# Patient Record
Sex: MALE | Race: WHITE | NOT HISPANIC OR LATINO
[De-identification: names, ages, dates, MRNs, and addresses within clinical notes are randomized per-mention and may not be internally consistent; named-entity substitution may affect disease eponyms.]

---

## 2020-01-01 ENCOUNTER — RESULT REVIEW (OUTPATIENT)
Age: 65
End: 2020-01-01

## 2020-01-01 ENCOUNTER — APPOINTMENT (OUTPATIENT)
Dept: GERIATRICS | Facility: CLINIC | Age: 65
End: 2020-01-01

## 2020-01-01 ENCOUNTER — NON-APPOINTMENT (OUTPATIENT)
Age: 65
End: 2020-01-01

## 2020-01-01 ENCOUNTER — APPOINTMENT (OUTPATIENT)
Dept: HEMATOLOGY ONCOLOGY | Facility: CLINIC | Age: 65
End: 2020-01-01
Payer: COMMERCIAL

## 2020-01-01 ENCOUNTER — APPOINTMENT (OUTPATIENT)
Dept: THORACIC SURGERY | Facility: CLINIC | Age: 65
End: 2020-01-01
Payer: COMMERCIAL

## 2020-01-01 ENCOUNTER — APPOINTMENT (OUTPATIENT)
Dept: THORACIC SURGERY | Facility: CLINIC | Age: 65
End: 2020-01-01

## 2020-01-01 ENCOUNTER — APPOINTMENT (OUTPATIENT)
Dept: HEMATOLOGY ONCOLOGY | Facility: CLINIC | Age: 65
End: 2020-01-01

## 2020-01-01 ENCOUNTER — APPOINTMENT (OUTPATIENT)
Dept: THORACIC SURGERY | Facility: HOSPITAL | Age: 65
End: 2020-01-01

## 2020-01-01 VITALS
TEMPERATURE: 98.1 F | SYSTOLIC BLOOD PRESSURE: 133 MMHG | OXYGEN SATURATION: 97 % | DIASTOLIC BLOOD PRESSURE: 69 MMHG | WEIGHT: 210 LBS | HEART RATE: 77 BPM | RESPIRATION RATE: 18 BRPM

## 2020-01-01 VITALS
WEIGHT: 205 LBS | HEART RATE: 61 BPM | DIASTOLIC BLOOD PRESSURE: 70 MMHG | BODY MASS INDEX: 27.77 KG/M2 | SYSTOLIC BLOOD PRESSURE: 153 MMHG | HEIGHT: 71.97 IN | RESPIRATION RATE: 18 BRPM | OXYGEN SATURATION: 95 % | TEMPERATURE: 97.5 F

## 2020-01-01 VITALS
OXYGEN SATURATION: 96 % | HEART RATE: 68 BPM | SYSTOLIC BLOOD PRESSURE: 177 MMHG | HEIGHT: 71.97 IN | DIASTOLIC BLOOD PRESSURE: 77 MMHG | RESPIRATION RATE: 18 BRPM | TEMPERATURE: 98.9 F | WEIGHT: 213 LBS | BODY MASS INDEX: 28.85 KG/M2

## 2020-01-01 VITALS
TEMPERATURE: 97.2 F | RESPIRATION RATE: 18 BRPM | BODY MASS INDEX: 28.68 KG/M2 | HEIGHT: 72 IN | OXYGEN SATURATION: 96 % | WEIGHT: 211.7 LBS | SYSTOLIC BLOOD PRESSURE: 137 MMHG | HEART RATE: 88 BPM | DIASTOLIC BLOOD PRESSURE: 80 MMHG

## 2020-01-01 VITALS
HEIGHT: 71.97 IN | DIASTOLIC BLOOD PRESSURE: 73 MMHG | WEIGHT: 206 LBS | BODY MASS INDEX: 27.9 KG/M2 | SYSTOLIC BLOOD PRESSURE: 173 MMHG | RESPIRATION RATE: 18 BRPM | TEMPERATURE: 98.4 F | HEART RATE: 64 BPM | OXYGEN SATURATION: 95 %

## 2020-01-01 VITALS
TEMPERATURE: 98.4 F | BODY MASS INDEX: 28.85 KG/M2 | HEIGHT: 71.97 IN | SYSTOLIC BLOOD PRESSURE: 179 MMHG | DIASTOLIC BLOOD PRESSURE: 76 MMHG | WEIGHT: 213 LBS | HEART RATE: 65 BPM | RESPIRATION RATE: 18 BRPM | OXYGEN SATURATION: 94 %

## 2020-01-01 VITALS
HEIGHT: 72 IN | DIASTOLIC BLOOD PRESSURE: 65 MMHG | BODY MASS INDEX: 28.58 KG/M2 | OXYGEN SATURATION: 97 % | SYSTOLIC BLOOD PRESSURE: 114 MMHG | TEMPERATURE: 97.9 F | HEART RATE: 66 BPM | WEIGHT: 211 LBS | RESPIRATION RATE: 18 BRPM

## 2020-01-01 VITALS
TEMPERATURE: 98.5 F | SYSTOLIC BLOOD PRESSURE: 186 MMHG | BODY MASS INDEX: 29.12 KG/M2 | HEIGHT: 71.97 IN | DIASTOLIC BLOOD PRESSURE: 81 MMHG | HEART RATE: 62 BPM | OXYGEN SATURATION: 100 % | WEIGHT: 215 LBS | RESPIRATION RATE: 18 BRPM

## 2020-01-01 DIAGNOSIS — Z86.79 PERSONAL HISTORY OF OTHER DISEASES OF THE CIRCULATORY SYSTEM: ICD-10-CM

## 2020-01-01 DIAGNOSIS — Z94.0 KIDNEY TRANSPLANT STATUS: ICD-10-CM

## 2020-01-01 DIAGNOSIS — Z80.0 FAMILY HISTORY OF MALIGNANT NEOPLASM OF DIGESTIVE ORGANS: ICD-10-CM

## 2020-01-01 DIAGNOSIS — M79.601 PAIN IN RIGHT ARM: ICD-10-CM

## 2020-01-01 DIAGNOSIS — R06.02 SHORTNESS OF BREATH: ICD-10-CM

## 2020-01-01 DIAGNOSIS — B18.2 CHRONIC VIRAL HEPATITIS C: ICD-10-CM

## 2020-01-01 DIAGNOSIS — F17.200 NICOTINE DEPENDENCE, UNSPECIFIED, UNCOMPLICATED: ICD-10-CM

## 2020-01-01 DIAGNOSIS — R05 COUGH: ICD-10-CM

## 2020-01-01 DIAGNOSIS — J18.9 PNEUMONIA, UNSPECIFIED ORGANISM: ICD-10-CM

## 2020-01-01 DIAGNOSIS — I10 ESSENTIAL (PRIMARY) HYPERTENSION: ICD-10-CM

## 2020-01-01 DIAGNOSIS — T80.90XA UNSPECIFIED COMPLICATION FOLLOWING INFUSION AND THERAPEUTIC INJECTION, INITIAL ENCOUNTER: ICD-10-CM

## 2020-01-01 DIAGNOSIS — B19.20 UNSPECIFIED VIRAL HEPATITIS C W/OUT HEPATIC COMA: ICD-10-CM

## 2020-01-01 DIAGNOSIS — R97.8 OTHER ABNORMAL TUMOR MARKERS: ICD-10-CM

## 2020-01-01 DIAGNOSIS — J90 PLEURAL EFFUSION, NOT ELSEWHERE CLASSIFIED: ICD-10-CM

## 2020-01-01 PROCEDURE — 99215 OFFICE O/P EST HI 40 MIN: CPT

## 2020-01-01 PROCEDURE — 99072 ADDL SUPL MATRL&STAF TM PHE: CPT

## 2020-01-01 PROCEDURE — 99214 OFFICE O/P EST MOD 30 MIN: CPT

## 2020-01-01 PROCEDURE — 99443: CPT

## 2020-01-01 PROCEDURE — 99205 OFFICE O/P NEW HI 60 MIN: CPT

## 2020-01-01 RX ORDER — ASPIRIN 81 MG
81 TABLET, DELAYED RELEASE (ENTERIC COATED) ORAL
Refills: 0 | Status: ACTIVE | COMMUNITY

## 2020-01-01 RX ORDER — FOLIC ACID 1 MG/1
1 TABLET ORAL DAILY
Qty: 90 | Refills: 0 | Status: ACTIVE | COMMUNITY
Start: 2020-01-01 | End: 1900-01-01

## 2020-01-01 RX ORDER — METOPROLOL SUCCINATE 25 MG/1
25 TABLET, EXTENDED RELEASE ORAL
Refills: 0 | Status: ACTIVE | COMMUNITY

## 2020-01-01 RX ORDER — LEVOFLOXACIN 250 MG/1
250 TABLET, FILM COATED ORAL
Qty: 7 | Refills: 0 | Status: ACTIVE | COMMUNITY
Start: 2020-01-01 | End: 1900-01-01

## 2020-01-01 RX ORDER — VELPATASVIR AND SOFOSBUVIR 100; 400 MG/1; MG/1
400-100 TABLET, FILM COATED ORAL
Qty: 28 | Refills: 0 | Status: ACTIVE | COMMUNITY
Start: 2020-01-01

## 2020-01-01 RX ORDER — ONDANSETRON 4 MG/1
4 TABLET ORAL
Qty: 30 | Refills: 6 | Status: ACTIVE | COMMUNITY
Start: 2020-01-01 | End: 1900-01-01

## 2020-01-01 RX ORDER — MYCOPHENOLIC ACID 360 MG/1
360 TABLET, DELAYED RELEASE ORAL
Refills: 0 | Status: ACTIVE | COMMUNITY

## 2020-01-01 RX ORDER — PREDNISONE 5 MG/1
5 TABLET ORAL DAILY
Qty: 30 | Refills: 11 | Status: ACTIVE | COMMUNITY
Start: 2020-01-01 | End: 1900-01-01

## 2020-01-01 RX ORDER — TACROLIMUS 1 MG/1
1 CAPSULE ORAL
Refills: 0 | Status: ACTIVE | COMMUNITY

## 2020-01-01 RX ORDER — TAMSULOSIN HYDROCHLORIDE 0.4 MG/1
0.4 CAPSULE ORAL
Qty: 90 | Refills: 0 | Status: ACTIVE | COMMUNITY
Start: 2020-01-01

## 2020-06-22 PROBLEM — Z00.00 ENCOUNTER FOR PREVENTIVE HEALTH EXAMINATION: Status: ACTIVE | Noted: 2020-01-01

## 2020-07-01 PROBLEM — Z80.0 FAMILY HISTORY OF PANCREATIC CANCER: Status: ACTIVE | Noted: 2020-01-01

## 2020-07-01 PROBLEM — Z86.79 HISTORY OF HYPERTENSION: Status: RESOLVED | Noted: 2020-01-01 | Resolved: 2020-01-01

## 2020-07-01 PROBLEM — F17.200 CURRENT SMOKER: Status: ACTIVE | Noted: 2020-01-01

## 2020-07-01 PROBLEM — B18.2 HEPATITIS C, CHRONIC: Status: RESOLVED | Noted: 2020-01-01 | Resolved: 2020-01-01

## 2020-07-09 NOTE — CONSULT LETTER
[Dear  ___] : Dear  [unfilled], [Consult Letter:] : I had the pleasure of evaluating your patient, [unfilled]. [Consult Closing:] : Thank you very much for allowing me to participate in the care of this patient.  If you have any questions, please do not hesitate to contact me. [Please see my note below.] : Please see my note below. [Sincerely,] : Sincerely, [FreeTextEntry3] : Gerardo Blanco MD, MPH\par Attending Physician\par Hematology Oncology\par [facility] [place]\par [facility]\par  [DrLilliana  ___] : Dr. GRIFFIN

## 2020-07-09 NOTE — RESULTS/DATA
[FreeTextEntry1] : Labs and images reviewed and discussed\par \par PET/CT (6/3/20)\par Moderate loculated right pleural effusion. 3.4 x 2 cm Right medial lung (SUV 8). FDG avid celiac lymph node 1 cm SUV 12.\par \par MRI brain. No metastases

## 2020-07-09 NOTE — HISTORY OF PRESENT ILLNESS
[de-identified] : Mr. Corbin Smith is pleasant 64 year old male with history Hepatic C, hypertension, kidney transplant (2009 from [facility]) referred by Dr. Dobbins for evaluation on adenocarcinoma of right lung. \par \par In January, patient initially presented with bronchitis symptoms with worsenign of cough. Patient was not able to follow up with any doctors due to Covid pandemic. Patient then had worsening of shortness of breath with right thoracentesis.  \par \par 5/15/20: 38 65-cc of serous fluid was aspirated of right lung:  POSITIVE FOR MALIGNANT CELLS - ADENOCARCINOMA.\par \par 20 CT of chest: A loculated residual right-sided pleural effusion is identified with scattered air bubbles within it consistent with recent thoracentesis procedure. Chronic changes of the lung are apparent. There is a small pleural-based nodular thickening at the right apex measuring 1 cm best visualized on image 13 of series 4. In addition there is pleural-based thickening with associated nodular component identified in the mid lung posteromedially best visualized on image 28 of series 4 and 54 of series 5 and this measures 1.8 x 1.6 x 1.9 cm. The remainder of the right lung reveals areas of compressive atelectasis and linear atelectasis.\par There are shotty precarinal lymph nodes the largest measures 1.6 x 0.9 cm.  Bone survey reveals no suspicious abnormalities.\par IMPRESSION: Large loculated right-sided pleural effusion with small component of air most likely representing recent thoracentesis procedure. There are 2 areas of pleural based nodular mass like thickening in the right lung\par \par 20: Right lung, CT-guided biopsy:  Adenocarcinoma consistent with lung primary site.\par Patient with mother, maternal grandmother and aunt with pancreatic cancer in their 79s and paternal grandmother with stroke and htn.  \par Patient has been smoking 1/2 a day since he's 12 and stills smokes, he has nicotine patch but has yet tried.\par \par Patient reports of 25 lbs weight loss due to combination of diet changes and thoracentesis since 2020.  Patient reports of being fatigue but no recent weight loss or . \par \par No fevers chills night sweats tiredness, apathy\par No chest pain,  palpitation, dizziness\par No abdominal pain, nausea, vomiting, diarrhea, constipation\par No bright red blood per rectum, hematemesis or melena\par No hematuria, dysuria, frequency, urgency\par No headaches, visual changes, focal weakness, tingling, numbness\par \par \par \par

## 2020-07-23 NOTE — HISTORY OF PRESENT ILLNESS
[de-identified] : Mr. Corbin Smith is pleasant 64 year old male with history Hepatic C, hypertension, kidney transplant (2009 from [facility]) referred by Dr. Dobbins for evaluation on adenocarcinoma of right lung. \par \par In January, patient initially presented with bronchitis symptoms with worsenign of cough. Patient was not able to follow up with any doctors due to Covid pandemic. Patient then had worsening of shortness of breath with right thoracentesis.  \par \par 5/15/20: 38 65-cc of serous fluid was aspirated of right lung:  POSITIVE FOR MALIGNANT CELLS - ADENOCARCINOMA.\par \par 20 CT of chest: A loculated residual right-sided pleural effusion is identified with scattered air bubbles within it consistent with recent thoracentesis procedure. Chronic changes of the lung are apparent. There is a small pleural-based nodular thickening at the right apex measuring 1 cm best visualized on image 13 of series 4. In addition there is pleural-based thickening with associated nodular component identified in the mid lung posteromedially best visualized on image 28 of series 4 and 54 of series 5 and this measures 1.8 x 1.6 x 1.9 cm. The remainder of the right lung reveals areas of compressive atelectasis and linear atelectasis.\par There are shotty precarinal lymph nodes the largest measures 1.6 x 0.9 cm.  Bone survey reveals no suspicious abnormalities.\par IMPRESSION: Large loculated right-sided pleural effusion with small component of air most likely representing recent thoracentesis procedure. There are 2 areas of pleural based nodular mass like thickening in the right lung\par \par 20: Right lung, CT-guided biopsy:  Adenocarcinoma consistent with lung primary site.\par Patient with mother, maternal grandmother and aunt with pancreatic cancer in their 79s and paternal grandmother with stroke and htn.  \par Patient has been smoking 1/2 a day since he's 12 and stills smokes, he has nicotine patch but has yet tried.\par \par Patient reports of 25 lbs weight loss due to combination of diet changes and thoracentesis since 2020.  Patient reports of being fatigue but no recent weight loss or . \par \par No fevers chills night sweats tiredness, apathy\par No chest pain,  palpitation, dizziness\par No abdominal pain, nausea, vomiting, diarrhea, constipation\par No bright red blood per rectum, hematemesis or melena\par No hematuria, dysuria, frequency, urgency\par No headaches, visual changes, focal weakness, tingling, numbness\par \par \par \par  [de-identified] : He is seen today for follow up\par \par He has started antiviral therapy (epclusa) for hepatitis C on 7/18/20\par Tolerating it well\par \par Additional work up showed:\par Foundation 1 TMB 15 mut/mb. No EGFR, ALK, ROS, BRAF, MET, NTRK mutations/fusions. Additional mutations in the report which is scanned\par \par - 320\par CEA - 39.6

## 2020-07-23 NOTE — CONSULT LETTER
[Dear  ___] : Dear  [unfilled], [Sincerely,] : Sincerely, [Please see my note below.] : Please see my note below. [Consult Closing:] : Thank you very much for allowing me to participate in the care of this patient.  If you have any questions, please do not hesitate to contact me. [DrLilliana  ___] : Dr. GRIFFIN [Courtesy Letter:] : I had the pleasure of seeing your patient, [unfilled], in my office today. [FreeTextEntry3] : Gerardo Blanco MD, MPH\par Attending Physician\par Hematology Oncology\par [facility] [place]\par [facility]\par

## 2020-07-23 NOTE — ASSESSMENT
[FreeTextEntry1] : Right lung adenocarcinoma\par Stage IV (pleural effusion, celiac lymph node)\par MRI joyce without brain metastases\par Ho renal transplant \par Hepatitic C - started on epclusa  7/18/20\par \par Foundation 1- No mutations with approved treatment for lung cancer\par TMB 15 mut/mb -> possibly predicts response to immunotherapy\par \par Explained at length about the standard of care which would be chemoimmunotherapy.\par It has been shown to improve Progression free survival and overall survival \par However there is retrospective data from [facility] which suggested ~41% rate of Allograft rejection with median time to rejection was 21 days.\par I have discussed with Dr Tj Vasquez ([facility]) who has an ongoing trial looking into immunotherapy in transplant recipients- He recommended low-dose tacro, dosed to achieve a trough level of 2-5 ng/mL + low-dose pred (5mg po daily). \par Mr Smith understands. He declines immunotherapy as this time. He says he would prefer to die than to go back on dialysis. He also does not want to take prednisone. He understands that immunotherapy may add to the response with chemo\par Plan\par Carboplatin alimta [id number] x 4-6 treatments followed by alimta maintenance \par He declines chemoport placement and wants drugs to be given peripherally\par To start folic acid today\par B12 on the day of treatment\par \par Elevated \par Positive celiac node (which is unusual site of metastases with lung cancer)\par Family history of pancreatic cancer\par Obtain MRI abdomen to look for any pancreatic disease\par \par Family ho pancreatic cancer\par Genetic testing negative\par \par Follow up on the day of chemo\par CBC, CMP, CEA, \par \par

## 2020-08-03 NOTE — HISTORY OF PRESENT ILLNESS
[Home] : at home, [unfilled] , at the time of the visit. [Medical Office: ([facility])___] : at the medical office located in  [Spouse] : spouse [Verbal consent obtained from patient] : the patient, [unfilled] [de-identified] : Mr. Corbin Smith is pleasant 64 year old male with history Hepatic C, hypertension, kidney transplant (2009 from H[facility]) referred by Dr. Dobbins for evaluation on adenocarcinoma of right lung. \par \par In January, patient initially presented with bronchitis symptoms with worsenign of cough. Patient was not able to follow up with any doctors due to Covid pandemic. Patient then had worsening of shortness of breath with right thoracentesis.  \par \par 5/15/20: 38 65-cc of serous fluid was aspirated of right lung:  POSITIVE FOR MALIGNANT CELLS - ADENOCARCINOMA.\par \par 20 CT of chest: A loculated residual right-sided pleural effusion is identified with scattered air bubbles within it consistent with recent thoracentesis procedure. Chronic changes of the lung are apparent. There is a small pleural-based nodular thickening at the right apex measuring 1 cm best visualized on image 13 of series 4. In addition there is pleural-based thickening with associated nodular component identified in the mid lung posteromedially best visualized on image 28 of series 4 and 54 of series 5 and this measures 1.8 x 1.6 x 1.9 cm. The remainder of the right lung reveals areas of compressive atelectasis and linear atelectasis.\par There are shotty precarinal lymph nodes the largest measures 1.6 x 0.9 cm.  Bone survey reveals no suspicious abnormalities.\par IMPRESSION: Large loculated right-sided pleural effusion with small component of air most likely representing recent thoracentesis procedure. There are 2 areas of pleural based nodular mass like thickening in the right lung\par \par 20: Right lung, CT-guided biopsy:  Adenocarcinoma consistent with lung primary site.\par Patient with mother, maternal grandmother and aunt with pancreatic cancer in their 79s and paternal grandmother with stroke and htn.  \par Patient has been smoking 1/2 a day since he's 12 and stills smokes, he has nicotine patch but has yet tried.\par \par Patient reports of 25 lbs weight loss due to combination of diet changes and thoracentesis since 2020.  Patient reports of being fatigue but no recent weight loss or . \par \par No fevers chills night sweats tiredness, apathy\par No chest pain,  palpitation, dizziness\par No abdominal pain, nausea, vomiting, diarrhea, constipation\par No bright red blood per rectum, hematemesis or melena\par No hematuria, dysuria, frequency, urgency\par No headaches, visual changes, focal weakness, tingling, numbness\par \par \par \par  [de-identified] : Telemedicine (audio) done today for follow up\par Consent obtained from the patient\par Accompanied by his wife\par \par He feels good overall\par On antiviral therapy (epclusa) for hepatitis C on 7/18/20\par \par He has not had MRI abdomen as yet (as he is tired of multiple tests for now)

## 2020-08-03 NOTE — ASSESSMENT
[FreeTextEntry1] : Right lung adenocarcinoma\par Stage IV (pleural effusion, celiac lymph node)\par MRI joyce without brain metastases\par Ho renal transplant \par Hepatitic C - started on epclusa  7/18/20\par \par Foundation 1- No mutations with approved treatment for lung cancer\par TMB 15 mut/mb -> possibly predicts response to immunotherapy\par \par He and his wife were explained again about the standard of care which would be chemoimmunotherapy.\par It has been shown to improve Progression free survival and overall survival \par However there is retrospective data from [facility] which suggested ~41% rate of Allograft rejection with median time to rejection was 21 days.\par \par He is now agreeable for chemoimmunotherapy\par He is agreeable for the immunosuppressive therapy which will now contain low-dose tacro, dosed to achieve a trough level of 2-5 ng/mL + low-dose pred (5mg po daily) (per discussion with Dr Tj Vasquez ([facility]))\par \par Plan\par Carboplatin alimta keytruda [id number] x 4-6 treatments followed by keytruda maintenance \par He declines chemoport placement and wants drugs to be given peripherally\par On folic acid today\par B12 on the day of treatment\par \par Elevated \par Positive celiac node (which is unusual site of metastases with lung cancer)\par Family history of pancreatic cancer\par MRI abdomen approved by insurance. he declines any further scans at this time\par To discuss again over the next few visits\par \par Family ho pancreatic cancer\par Genetic testing negative\par \par Follow up on the day of chemo\par CBC, CMP, CEA, \par \par

## 2020-08-03 NOTE — RESULTS/DATA
[FreeTextEntry1] : Labs and images reviewed and discussed\par \par PET/CT (6/3/20)\par Moderate loculated right pleural effusion. 3.4 x 2 cm Right medial lung (SUV 8). FDG avid celiac lymph node 1 cm SUV 12.\par \par MRI brain. No metastases\par \par Additional work up showed:\par Foundation 1 TMB 15 mut/mb. No EGFR, ALK, ROS, BRAF, MET, NTRK mutations/fusions. Additional mutations in the report which is scanned\par \par - 320\par CEA - 39.6

## 2020-08-03 NOTE — CONSULT LETTER
[Dear  ___] : Dear  [unfilled], [Please see my note below.] : Please see my note below. [Courtesy Letter:] : I had the pleasure of seeing your patient, [unfilled], in my office today. [Consult Closing:] : Thank you very much for allowing me to participate in the care of this patient.  If you have any questions, please do not hesitate to contact me. [Sincerely,] : Sincerely, [DrLilliana  ___] : Dr. GRIFFIN [FreeTextEntry3] : Gerardo Blanco MD, MPH\par Attending Physician\par Hematology Oncology\par [facility] [place]\par [facility]\par

## 2020-08-05 PROBLEM — R97.8 ELEVATED CA 19-9 LEVEL: Status: ACTIVE | Noted: 2020-01-01

## 2020-08-05 NOTE — CONSULT LETTER
[Courtesy Letter:] : I had the pleasure of seeing your patient, [unfilled], in my office today. [Dear  ___] : Dear  [unfilled], [Please see my note below.] : Please see my note below. [FreeTextEntry3] : Gerardo Blanco MD, MPH\par Attending Physician\par Hematology Oncology\par [facility] [place]\par [facility]\par  [Consult Closing:] : Thank you very much for allowing me to participate in the care of this patient.  If you have any questions, please do not hesitate to contact me. [Sincerely,] : Sincerely, [DrLilliana  ___] : Dr. GRIFFIN

## 2020-08-05 NOTE — RESULTS/DATA
[FreeTextEntry1] : Labs and images reviewed and discussed\par \par PET/CT (6/3/20)\par Moderate loculated right pleural effusion. 3.4 x 2 cm Right medial lung (SUV 8). FDG avid celiac lymph node 1 cm SUV 12.\par \par MRI brain. No metastases\par \par Additional work up showed:\par Foundation1:- TMB 15 mut/mb. No EGFR, ALK, ROS, BRAF, MET, NTRK mutations/fusions. Additional mutations in the report which is scanned\par \par - 320\par CEA - 39.6

## 2020-08-05 NOTE — PHYSICAL EXAM
[Restricted in physically strenuous activity but ambulatory and able to carry out work of a light or sedentary nature] : Status 1- Restricted in physically strenuous activity but ambulatory and able to carry out work of a light or sedentary nature, e.g., light house work, office work [Normal] : grossly intact [de-identified] : No wheezing

## 2020-10-01 PROBLEM — Z94.0 KIDNEY TRANSPLANT RECIPIENT: Status: RESOLVED | Noted: 2020-01-01 | Resolved: 2020-01-01

## 2020-10-29 NOTE — HISTORY OF PRESENT ILLNESS
[de-identified] : Mr. Corbin Smith is pleasant 64 year old male with history Hepatic C, hypertension, kidney transplant (2009 from H[facility]) referred by Dr. Dobbins for evaluation on adenocarcinoma of right lung. \par \par In January, patient initially presented with bronchitis symptoms with worsenign of cough. Patient was not able to follow up with any doctors due to Covid pandemic. Patient then had worsening of shortness of breath with right thoracentesis.  \par \par 5/15/20: 38 65-cc of serous fluid was aspirated of right lung:  POSITIVE FOR MALIGNANT CELLS - ADENOCARCINOMA.\par \par 20 CT of chest: A loculated residual right-sided pleural effusion is identified with scattered air bubbles within it consistent with recent thoracentesis procedure. Chronic changes of the lung are apparent. There is a small pleural-based nodular thickening at the right apex measuring 1 cm best visualized on image 13 of series 4. In addition there is pleural-based thickening with associated nodular component identified in the mid lung posteromedially best visualized on image 28 of series 4 and 54 of series 5 and this measures 1.8 x 1.6 x 1.9 cm. The remainder of the right lung reveals areas of compressive atelectasis and linear atelectasis.\par There are shotty precarinal lymph nodes the largest measures 1.6 x 0.9 cm.  Bone survey reveals no suspicious abnormalities.\par IMPRESSION: Large loculated right-sided pleural effusion with small component of air most likely representing recent thoracentesis procedure. There are 2 areas of pleural based nodular mass like thickening in the right lung\par \par 20: Right lung, CT-guided biopsy:  Adenocarcinoma consistent with lung primary site.\par Patient with mother, maternal grandmother and aunt with pancreatic cancer in their 79s and paternal grandmother with stroke and htn.  \par Patient has been smoking 1/2 a day since he's 12 and stills smokes, he has nicotine patch but has yet tried.\par \par Patient reports of 25 lbs weight loss due to combination of diet changes and thoracentesis since 2020.  Patient reports of being fatigue but no recent weight loss or . \par \par No fevers chills night sweats tiredness, apathy\par No chest pain,  palpitation, dizziness\par No abdominal pain, nausea, vomiting, diarrhea, constipation\par No bright red blood per rectum, hematemesis or melena\par No hematuria, dysuria, frequency, urgency\par No headaches, visual changes, focal weakness, tingling, numbness\par \par \par \par  [de-identified] : He is seen today for follow up and begin C1 Carboplatin paclitaxel tecentriq and avastin (10/29/20 - present) \par He is s/p carboplatin alimta and keytruda (8/5/20)\par On antiviral therapy (epclusa) for hepatitis C on 7/18/20\par \par He unfortunately developed ESRD following first cycle of chemoimmunotherapy and is now on dialysis M-W-F\par He decide for forgo treatment at that time and has not had any treatment since 8/5/20\par He has chest tube and drained 400 cc few days ago.\par Overall has had worsening shortness of breath. He is now unable to walk and is wheelchair bound. Has had significant decline in his quality of life and performance status\par He changed his mind and came for treatment today\par \par He was supposed to get PET/CT prior to the treatment today, however his insurance initially declined it. Today AM it was approved upon appeal\par He will schedule PET/CT over the next few days\par \par O2- 100% on 4 L NC\par /81\par At home BP has ranged 150-160s/70s-90s\par

## 2020-10-29 NOTE — PHYSICAL EXAM
[Normal] : normal appearance, no rash, nodules, vesicles, ulcers, erythema [Completely disabled. Cannot carry on any self care. Totally confined to bed or chair] : Status 4- Completely disabled. Cannot carry on any self care. Totally confined to bed or chair [de-identified] : Right basilar dullness [de-identified] : Wheelchair bound [de-identified] : Anxious

## 2020-10-29 NOTE — ASSESSMENT
[FreeTextEntry1] : Right lung adenocarcinoma\par Stage IV (pleural effusion, celiac lymph node)\par MRI jyoce without brain metastases\par Ho renal transplant \par Hepatitic C - started on epclusa  7/18/20\par Foundation 1- No mutations with approved treatment for lung cancer\par TMB 15 mut/mb -> ?? possibly predicts response to immunotherapy\par s/p C1 carboplatin alimta keytruda (8/5/20)\par Had transplant failure, now on hemodialysis M-W-F\par Decided to forgo any treatments until recently when he changed his mind\par \par He has had worsening sob. Not able to do anything. Wheelchair bound\par He is nervous about how he is doing\par Did not obtain PET prior to the appointment pending insurance approval\par Now approved. He will schedule\par I have reviewed the risks, benefits and side effects of chemotherapy with the patient. All questions were answered to satisfaction. Patient agrees to pursue the planned chemotherapy.\par To start IMPOWER regimen today with significant dose reduction\par Paclitaxel 80mg/m2, Carbo AUC 4, Tecentriq 1200, avastin 5 mg/kg (due to baseline elevated BP)\par Avastin will help with his pleural effusion\par Will attempt thoracentesis today\par Stop B12, folic acid\par \par Anemia\par Likely multifactorial- Anemia of kidney disease vs other causes\par Will send work up next visit\par Will check with Dr Dobbins if he is on procrit/aranesp.\par If not, will give it today\par \par Elevated \par Positive celiac node (which is unusual site of metastases with lung cancer)\par Family history of pancreatic cancer\par MRI abdomen approved by insurance. he initially declined, now agreeable\par \par Family ho pancreatic cancer\par Genetic testing negative\par \par Follow up in 1 weeks for miryam labs and 3 weeks for C2\par CBC, CMP, CEA, , anemia panel\par \par

## 2020-10-29 NOTE — CONSULT LETTER
[Dear  ___] : Dear  [unfilled], [Courtesy Letter:] : I had the pleasure of seeing your patient, [unfilled], in my office today. [Please see my note below.] : Please see my note below. [Consult Closing:] : Thank you very much for allowing me to participate in the care of this patient.  If you have any questions, please do not hesitate to contact me. [Sincerely,] : Sincerely, [DrLilliana  ___] : Dr. GRIFFIN [FreeTextEntry3] : Gerardo Blanco MD, MPH\par Attending Physician\par Hematology Oncology\par [facility] [place]\par [facility]\par

## 2020-10-29 NOTE — REVIEW OF SYSTEMS
[Night Sweats] : night sweats [Fatigue] : fatigue [Cough] : cough [Negative] : Allergic/Immunologic [FreeTextEntry2] : Review of systems negative except as outlined in HPI

## 2020-11-05 PROBLEM — Z94.0 TRANSPLANTED KIDNEY: Status: ACTIVE | Noted: 2020-01-01

## 2020-11-05 PROBLEM — B19.20 HEPATITIS C: Status: ACTIVE | Noted: 2020-01-01

## 2020-11-05 NOTE — HISTORY OF PRESENT ILLNESS
[de-identified] : Mr. Corbin Smith is pleasant 64 year old male with history Hepatic C, hypertension, kidney transplant (2009 from [facility]) referred by Dr. Dobbins for evaluation on adenocarcinoma of right lung. \par \par In January, patient initially presented with bronchitis symptoms with worsenign of cough. Patient was not able to follow up with any doctors due to Covid pandemic. Patient then had worsening of shortness of breath with right thoracentesis.  \par \par 5/15/20: 38 65-cc of serous fluid was aspirated of right lung:  POSITIVE FOR MALIGNANT CELLS - ADENOCARCINOMA.\par \par 20 CT of chest: A loculated residual right-sided pleural effusion is identified with scattered air bubbles within it consistent with recent thoracentesis procedure. Chronic changes of the lung are apparent. There is a small pleural-based nodular thickening at the right apex measuring 1 cm best visualized on image 13 of series 4. In addition there is pleural-based thickening with associated nodular component identified in the mid lung posteromedially best visualized on image 28 of series 4 and 54 of series 5 and this measures 1.8 x 1.6 x 1.9 cm. The remainder of the right lung reveals areas of compressive atelectasis and linear atelectasis.\par There are shotty precarinal lymph nodes the largest measures 1.6 x 0.9 cm.  Bone survey reveals no suspicious abnormalities.\par IMPRESSION: Large loculated right-sided pleural effusion with small component of air most likely representing recent thoracentesis procedure. There are 2 areas of pleural based nodular mass like thickening in the right lung\par \par 20: Right lung, CT-guided biopsy:  Adenocarcinoma consistent with lung primary site.\par Patient with mother, maternal grandmother and aunt with pancreatic cancer in their 79s and paternal grandmother with stroke and htn.  \par Patient has been smoking 1/2 a day since he's 12 and stills smokes, he has nicotine patch but has yet tried.\par \par Patient reports of 25 lbs weight loss due to combination of diet changes and thoracentesis since 2020.  Patient reports of being fatigue but no recent weight loss or . \par \par No fevers chills night sweats tiredness, apathy\par No chest pain,  palpitation, dizziness\par No abdominal pain, nausea, vomiting, diarrhea, constipation\par No bright red blood per rectum, hematemesis or melena\par No hematuria, dysuria, frequency, urgency\par No headaches, visual changes, focal weakness, tingling, numbness\par \par \par He unfortunately developed ESRD following first cycle of chemoimmunotherapy and is now on dialysis \par He decide for forgo treatment at that time and has not had any treatment since 20\par He has chest tube and drained 400 cc few days ago.\par Overall has had worsening shortness of breath. He is now unable to walk and is wheelchair bound. Has had significant decline in his quality of life and performance status\par He changed his mind and came for treatment today\par \par  [de-identified] : He is seen today for miryam labs \par He is s/p C1 Carboplatin paclitaxel tecentriq and avastin (10/29/20 - present) \par He is s/p carboplatin alimta and keytruda (8/5/20)\par On antiviral therapy (epclusa) for hepatitis C on 7/18/20\par \par He co shortness of breath\par Drained 390 cc from right lung 2 days ago\par O2 94% on 2L NC\par \par He does not want to go to ED\par \par Stat CXR (11/5/20) shows Unchanged tunneled Pleurx catheter in the right lung with volume loss, consistent with trapped\par lung.\par \par He was supposed to get PET/CT prior to the treatment today, however his cancelled it as he was not feeling well\par

## 2020-11-05 NOTE — CONSULT LETTER
[Dear  ___] : Dear  [unfilled], [Courtesy Letter:] : I had the pleasure of seeing your patient, [unfilled], in my office today. [Please see my note below.] : Please see my note below. [Consult Closing:] : Thank you very much for allowing me to participate in the care of this patient.  If you have any questions, please do not hesitate to contact me. [Sincerely,] : Sincerely, [DrLilliana  ___] : Dr. GRIFFIN [FreeTextEntry3] : Gerardo Blanco MD, MPH\par Attending Physician\par Hematology Oncology\par [facility] [place]\par [facility]\par

## 2020-11-05 NOTE — PHYSICAL EXAM
[Completely disabled. Cannot carry on any self care. Totally confined to bed or chair] : Status 4- Completely disabled. Cannot carry on any self care. Totally confined to bed or chair [Normal] : normal appearance, no rash, nodules, vesicles, ulcers, erythema [de-identified] : Right basilar dullness [de-identified] : Wheelchair bound [de-identified] : Anxious

## 2020-11-05 NOTE — ASSESSMENT
[FreeTextEntry1] : Right lung adenocarcinoma\par Stage IV (pleural effusion, celiac lymph node)\par MRI joyce without brain metastases\par Ho renal transplant \par Hepatitic C - started on epclusa  7/18/20\par Foundation 1- No mutations with approved treatment for lung cancer\par TMB 15 mut/mb -> ?? possibly predicts response to immunotherapy\par s/p C1 carboplatin alimta keytruda (8/5/20)\par Had transplant failure, now on hemodialysis M-W-F\par Decided to forgo any treatments until recently when he changed his mind\par Had worsening sob, declining performance status, Wheelchair bound\par Decided to resume treatment\par \par Here for miryam labs\par Co sob \par Did not obtain PET prior to the appointment. Now scheduled for next week\par Stat CXR shows moderate right effusion with trapped lung\par ENcouraged to have PET/CT\par Follow up with Dr Maxi Loera post scan\par Refer to [facility] care (Dr Bolton) for symptom management\par Plan\par Paclitaxel 80mg/m2, Carbo AUC 4, Tecentriq 1200, avastin 5 mg/kg (due to baseline elevated BP)\par Avastin will help with his pleural effusion\par \par Anemia\par Likely multifactorial- Anemia of kidney disease vs other causes\par Will send work up next visit\par He is on procrit/aranesp.\par \par Elevated \par Positive celiac node (which is unusual site of metastases with lung cancer)\par Family history of pancreatic cancer\par MRI abdomen approved by insurance. he initially declined, now agreeable\par \par Family ho pancreatic cancer\par Genetic testing negative\par \par Follow up in 2 weeks for C2\par CBC, CMP, CEA,

## 2020-11-23 NOTE — HISTORY OF PRESENT ILLNESS
[FreeTextEntry1] : 64 y/o M with stage IV lung cancer presents for follow up\par \par Patient was diagnosed with lung ca in August 2020.  He underwent RVATS decortication, drainage of effusion, insertion of Aspira catheter on 8/26/2020.

## 2020-11-23 NOTE — DATA REVIEWED
[FreeTextEntry1] : PET 11/10/2020:  Redemonstrated moderate loculated right pleural effusion with associated atelectasis..  Increased nodular pleural thickening with hypermetabolic FDG uptake consistent with a malignant effusion. Focal uptake within the medial aspect of the right lung is again seen which may represent pleural disease vs. a parenchymal pulmonary lesion.  Hypermetabolic lymphadenopathy within the right paratracheal, right emely and celiac access.  Enlarged right axillary and aortopulmonary window lymph nodes are noted but do not exhibit increased FDG uptake.  THere is a 1.9 cm hypodense lesion within the right thyroid.  My hydronephrosis of the right pelvic transplant kidney\par

## 2020-11-30 PROBLEM — T80.90XA INFUSION REACTION: Status: ACTIVE | Noted: 2020-01-01

## 2020-11-30 NOTE — ASSESSMENT
[FreeTextEntry1] : Right lung adenocarcinoma\par Stage IV (pleural effusion, celiac lymph node)\par MRI joyce without brain metastases\par Ho renal transplant \par Hepatitic C - started on epclusa  7/18/20\par Foundation 1- No mutations with approved treatment for lung cancer\par TMB 15 mut/mb -> ?? possibly predicts response to immunotherapy\par s/p C1 carboplatin alimta keytruda (8/5/20)\par Had transplant failure, now on hemodialysis M-W-F\par Decided to forgo any treatments until recently when he changed his mind\par Had worsening sob, declining performance status, Wheelchair bound\par Decided to resume treatment\par \par Here for C2\par Feels better since starting chemo\par Labs reviewed, analyzed and discussed\par PET/CT reviewed and discussed\par Plan\par Paclitaxel 80mg/m2, Carbo AUC 4, Tecentriq 1200, avastin 5 mg/kg (due to baseline elevated BP)\par Avastin will help with his pleural effusion\par \par Anemia\par Likely multifactorial- Anemia of kidney disease vs other causes\par Will send work up next visit\par He is on procrit/aranesp.\par \par Elevated \par Positive celiac node (which is unusual site of metastases with lung cancer)\par Family history of pancreatic cancer\par MRI abdomen approved by insurance. he initially declined, now agreeable\par \par Family ho pancreatic cancer\par Genetic testing negative\par \par Follow up in 3 weeks for C3\par CBC, CMP, CEA, \par \par Addendum:\par He received bevacizumab and tecentriq without any issues. Per his request benadryl was reduced to 25 mg prior to paclitaxel. he had a reaction to paclitaxel when he developed sob, change in voice, sweating s/p steroids, tylenol\par He declined to complete taxol or get carboplatin

## 2020-11-30 NOTE — RESULTS/DATA
[FreeTextEntry1] : Labs and images reviewed and discussed\par \par PET/CT (6/3/20)\par Moderate loculated right pleural effusion. 3.4 x 2 cm Right medial lung (SUV 8). FDG avid celiac lymph node 1 cm SUV 12.\par \par MRI brain. No metastases\par \par Additional work up showed:\par Foundation1:- TMB 15 mut/mb. No EGFR, ALK, ROS, BRAF, MET, NTRK mutations/fusions. Additional mutations in the report which is scanned\par \par - 320 -> 624\par CEA - 39.6 -> 57.7

## 2020-11-30 NOTE — HISTORY OF PRESENT ILLNESS
[de-identified] : Mr. oCrbin Smith is pleasant 64 year old male with history Hepatic C, hypertension, kidney transplant (2009 from [facility]) referred by Dr. Dobbins for evaluation on adenocarcinoma of right lung. \par \par In January, patient initially presented with bronchitis symptoms with worsenign of cough. Patient was not able to follow up with any doctors due to Covid pandemic. Patient then had worsening of shortness of breath with right thoracentesis.  \par \par 5/15/20: 38 65-cc of serous fluid was aspirated of right lung:  POSITIVE FOR MALIGNANT CELLS - ADENOCARCINOMA.\par \par 20 CT of chest: A loculated residual right-sided pleural effusion is identified with scattered air bubbles within it consistent with recent thoracentesis procedure. Chronic changes of the lung are apparent. There is a small pleural-based nodular thickening at the right apex measuring 1 cm best visualized on image 13 of series 4. In addition there is pleural-based thickening with associated nodular component identified in the mid lung posteromedially best visualized on image 28 of series 4 and 54 of series 5 and this measures 1.8 x 1.6 x 1.9 cm. The remainder of the right lung reveals areas of compressive atelectasis and linear atelectasis.\par There are shotty precarinal lymph nodes the largest measures 1.6 x 0.9 cm.  Bone survey reveals no suspicious abnormalities.\par IMPRESSION: Large loculated right-sided pleural effusion with small component of air most likely representing recent thoracentesis procedure. There are 2 areas of pleural based nodular mass like thickening in the right lung\par \par 20: Right lung, CT-guided biopsy:  Adenocarcinoma consistent with lung primary site.\par Patient with mother, maternal grandmother and aunt with pancreatic cancer in their 79s and paternal grandmother with stroke and htn.  \par Patient has been smoking 1/2 a day since he's 12 and stills smokes, he has nicotine patch but has yet tried.\par \par Patient reports of 25 lbs weight loss due to combination of diet changes and thoracentesis since 2020.  Patient reports of being fatigue but no recent weight loss or . \par \par No fevers chills night sweats tiredness, apathy\par No chest pain,  palpitation, dizziness\par No abdominal pain, nausea, vomiting, diarrhea, constipation\par No bright red blood per rectum, hematemesis or melena\par No hematuria, dysuria, frequency, urgency\par No headaches, visual changes, focal weakness, tingling, numbness\par \par \par He unfortunately developed ESRD following first cycle of chemoimmunotherapy and is now on dialysis \par He decide for forgo treatment at that time and has not had any treatment since 20\par He has chest tube and drained 400 cc few days ago.\par Overall has had worsening shortness of breath. He is now unable to walk and is wheelchair bound. Has had significant decline in his quality of life and performance status\par He changed his mind and came for treatment today\par \par  [de-identified] : He is seen today for C2 Carboplatin paclitaxel tecentriq and avastin (10/29/20 - present) \par He is s/p carboplatin alimta and keytruda (8/5/20)\par On antiviral therapy (epclusa) for hepatitis C on 7/18/20\par \par He feels much better since the chemo\par Shortness of breath is better\par \par Drained 400-500 from right lung 2-3 times a week\par O2 96% on 2L NC\par \par

## 2020-11-30 NOTE — PHYSICAL EXAM
[Completely disabled. Cannot carry on any self care. Totally confined to bed or chair] : Status 4- Completely disabled. Cannot carry on any self care. Totally confined to bed or chair [Normal] : normal appearance, no rash, nodules, vesicles, ulcers, erythema [de-identified] : Right basilar dullness [de-identified] : Wheelchair bound [de-identified] : Anxious

## 2020-11-30 NOTE — CONSULT LETTER
[Dear  ___] : Dear  [unfilled], [Courtesy Letter:] : I had the pleasure of seeing your patient, [unfilled], in my office today. [Please see my note below.] : Please see my note below. [Consult Closing:] : Thank you very much for allowing me to participate in the care of this patient.  If you have any questions, please do not hesitate to contact me. [Sincerely,] : Sincerely, [DrLilliana  ___] : Dr. GRIFFIN [FreeTextEntry3] : Gerardo Blanco MD, MPH\par Attending Physician\par Hematology Oncology\par [facility] [place]\par [facility]\par

## 2020-12-10 PROBLEM — I10 HYPERTENSION: Status: ACTIVE | Noted: 2020-01-01

## 2020-12-10 PROBLEM — M79.601 PAIN OF RIGHT UPPER EXTREMITY: Status: ACTIVE | Noted: 2020-01-01

## 2020-12-10 PROBLEM — R06.02 SHORTNESS OF BREATH: Status: ACTIVE | Noted: 2020-01-01

## 2020-12-10 NOTE — HISTORY OF PRESENT ILLNESS
[de-identified] : Mr. Corbin Smith is pleasant 64 year old male with history Hepatic C, hypertension, kidney transplant (2009 from [facility]) referred by Dr. Dobbins for evaluation on adenocarcinoma of right lung. \par \par In January, patient initially presented with bronchitis symptoms with worsenign of cough. Patient was not able to follow up with any doctors due to Covid pandemic. Patient then had worsening of shortness of breath with right thoracentesis.  \par \par 5/15/20: 38 65-cc of serous fluid was aspirated of right lung:  POSITIVE FOR MALIGNANT CELLS - ADENOCARCINOMA.\par \par 20 CT of chest: A loculated residual right-sided pleural effusion is identified with scattered air bubbles within it consistent with recent thoracentesis procedure. Chronic changes of the lung are apparent. There is a small pleural-based nodular thickening at the right apex measuring 1 cm best visualized on image 13 of series 4. In addition there is pleural-based thickening with associated nodular component identified in the mid lung posteromedially best visualized on image 28 of series 4 and 54 of series 5 and this measures 1.8 x 1.6 x 1.9 cm. The remainder of the right lung reveals areas of compressive atelectasis and linear atelectasis.\par There are shotty precarinal lymph nodes the largest measures 1.6 x 0.9 cm.  Bone survey reveals no suspicious abnormalities.\par IMPRESSION: Large loculated right-sided pleural effusion with small component of air most likely representing recent thoracentesis procedure. There are 2 areas of pleural based nodular mass like thickening in the right lung\par \par 20: Right lung, CT-guided biopsy:  Adenocarcinoma consistent with lung primary site.\par Patient with mother, maternal grandmother and aunt with pancreatic cancer in their 79s and paternal grandmother with stroke and htn.  \par Patient has been smoking 1/2 a day since he's 12 and stills smokes, he has nicotine patch but has yet tried.\par \par Patient reports of 25 lbs weight loss due to combination of diet changes and thoracentesis since 2020.  Patient reports of being fatigue but no recent weight loss or . \par \par No fevers chills night sweats tiredness, apathy\par No chest pain,  palpitation, dizziness\par No abdominal pain, nausea, vomiting, diarrhea, constipation\par No bright red blood per rectum, hematemesis or melena\par No hematuria, dysuria, frequency, urgency\par No headaches, visual changes, focal weakness, tingling, numbness\par \par \par He unfortunately developed ESRD following first cycle of chemoimmunotherapy and is now on dialysis \par He decide for forgo treatment at that time and has not had any treatment since 20\par He has chest tube and drained 400 cc few days ago.\par Overall has had worsening shortness of breath. He is now unable to walk and is wheelchair bound. Has had significant decline in his quality of life and performance status\par He changed his mind and came for treatment today\par \par  [de-identified] : He is seen today for C3 Carboplatin paclitaxel tecentriq and avastin (10/29/20 - present) \par He is s/p carboplatin alimta and keytruda (8/5/20)\par On antiviral therapy (epclusa) for hepatitis C on 7/18/20\par \par He feels much better after C1 but not as well after C2\par However C2 chemo- he only received avastin and tecentriq\par \par Chemoport could not be done with Dr Loera- will attempt through IR\par \par Drained 400 from right lung 2 times a week\par O2 95% on 2L NC\par \par

## 2020-12-10 NOTE — CONSULT LETTER
[Dear  ___] : Dear  [unfilled], [Courtesy Letter:] : I had the pleasure of seeing your patient, [unfilled], in my office today. [Please see my note below.] : Please see my note below. [Consult Closing:] : Thank you very much for allowing me to participate in the care of this patient.  If you have any questions, please do not hesitate to contact me. [Sincerely,] : Sincerely, [DrLilliana  ___] : Dr. GRIFFIN [FreeTextEntry3] : Gerardo Blanco MD, MPH\par Attending Physician\par Hematology Oncology\par [facility] [place]\par [facility]\par

## 2020-12-10 NOTE — ASSESSMENT
[FreeTextEntry1] : Right lung adenocarcinoma\par Stage IV (pleural effusion, celiac lymph node)\par MRI joyce without brain metastases\par Ho renal transplant \par Hepatitic C - started on epclusa  7/18/20\par Foundation 1- No mutations with approved treatment for lung cancer\par TMB 15 mut/mb -> ?? possibly predicts response to immunotherapy\par s/p C1 carboplatin alimta keytruda (8/5/20)\par Had transplant failure, now on hemodialysis M-W-F\par Decided to forgo any treatments until recently when he changed his mind\par Had worsening sob, declining performance status, Wheelchair bound\par Decided to resume treatment\par \par Here for C3\par Labs reviewed, analyzed and discussed\par Proceed with chemo\par PET/CT reviewed and discussed\par Restaging scans in Feb 2021\par Plan\par Paclitaxel 80mg/m2, Carbo AUC 4, Tecentriq 1200, avastin 5 mg/kg (due to baseline elevated BP)\par Avastin will help with his pleural effusion\par \par Anemia\par Likely multifactorial- Anemia of kidney disease vs other causes\par He is on procrit/aranesp.\par \par Elevated \par Positive celiac node (which is unusual site of metastases with lung cancer)\par Family history of pancreatic cancer\par MRI abdomen approved by insurance. he initially declined, now agreeable\par \par Family ho pancreatic cancer\par Genetic testing negative\par \par Follow up in 3 weeks for C4\par CBC, CMP, CEA, \par \par

## 2020-12-10 NOTE — PHYSICAL EXAM
[Completely disabled. Cannot carry on any self care. Totally confined to bed or chair] : Status 4- Completely disabled. Cannot carry on any self care. Totally confined to bed or chair [Normal] : normal appearance, no rash, nodules, vesicles, ulcers, erythema [de-identified] : Right basilar dullness [de-identified] : Wheelchair bound [de-identified] : Anxious

## 2020-12-31 PROBLEM — J90 PLEURAL EFFUSION: Status: ACTIVE | Noted: 2020-01-01

## 2020-12-31 PROBLEM — R05 COUGH: Status: ACTIVE | Noted: 2020-01-01

## 2020-12-31 NOTE — HISTORY OF PRESENT ILLNESS
[de-identified] : Mr. Corbin Smith is pleasant 64 year old male with history Hepatic C, hypertension, kidney transplant (2009 from H[facility]) referred by Dr. Dobbins for evaluation on adenocarcinoma of right lung. \par \par In January, patient initially presented with bronchitis symptoms with worsenign of cough. Patient was not able to follow up with any doctors due to Covid pandemic. Patient then had worsening of shortness of breath with right thoracentesis.  \par \par 5/15/20: 38 65-cc of serous fluid was aspirated of right lung:  POSITIVE FOR MALIGNANT CELLS - ADENOCARCINOMA.\par \par 20 CT of chest: A loculated residual right-sided pleural effusion is identified with scattered air bubbles within it consistent with recent thoracentesis procedure. Chronic changes of the lung are apparent. There is a small pleural-based nodular thickening at the right apex measuring 1 cm best visualized on image 13 of series 4. In addition there is pleural-based thickening with associated nodular component identified in the mid lung posteromedially best visualized on image 28 of series 4 and 54 of series 5 and this measures 1.8 x 1.6 x 1.9 cm. The remainder of the right lung reveals areas of compressive atelectasis and linear atelectasis.\par There are shotty precarinal lymph nodes the largest measures 1.6 x 0.9 cm.  Bone survey reveals no suspicious abnormalities.\par IMPRESSION: Large loculated right-sided pleural effusion with small component of air most likely representing recent thoracentesis procedure. There are 2 areas of pleural based nodular mass like thickening in the right lung\par \par 20: Right lung, CT-guided biopsy:  Adenocarcinoma consistent with lung primary site.\par Patient with mother, maternal grandmother and aunt with pancreatic cancer in their 79s and paternal grandmother with stroke and htn.  \par Patient has been smoking 1/2 a day since he's 12 and stills smokes, he has nicotine patch but has yet tried.\par \par Patient reports of 25 lbs weight loss due to combination of diet changes and thoracentesis since 2020.  Patient reports of being fatigue but no recent weight loss or . \par \par No fevers chills night sweats tiredness, apathy\par No chest pain,  palpitation, dizziness\par No abdominal pain, nausea, vomiting, diarrhea, constipation\par No bright red blood per rectum, hematemesis or melena\par No hematuria, dysuria, frequency, urgency\par No headaches, visual changes, focal weakness, tingling, numbness\par \par \par He unfortunately developed ESRD following first cycle of chemoimmunotherapy and is now on dialysis \par He decide for forgo treatment at that time and has not had any treatment since 20\par He has chest tube and drained 400 cc few days ago.\par Overall has had worsening shortness of breath. He is now unable to walk and is wheelchair bound. Has had significant decline in his quality of life and performance status\par He changed his mind and came for treatment today\par \par  [de-identified] : He is seen today for C4 Carboplatin paclitaxel tecentriq and avastin (10/29/20 - present) \par He is s/p carboplatin alimta and keytruda (8/5/20)\par On antiviral therapy (epclusa) for hepatitis C on 7/18/20\par \par He feels "crappy"\par Tired, cough with yellow, brown, red phlegm\par He recently got Abx with dialysis x 2\par \par He feels much better after C1 but not as well after C2\par However C2 chemo- he only received avastin and tecentriq\par \par Drained 400 from right lung 2 times a week\par O2 95% on 2L NC\par \par

## 2020-12-31 NOTE — REASON FOR VISIT
[Follow-Up Visit] : a follow-up [FreeTextEntry1] : Patient in Infusion for treatment. He is worried because he feels his functional status is declining. Worries if his wife can continue to care for him. as he declines. WIll follow . [FreeTextEntry2] : Adenocarcinoma of lung

## 2020-12-31 NOTE — PHYSICAL EXAM
[Completely disabled. Cannot carry on any self care. Totally confined to bed or chair] : Status 4- Completely disabled. Cannot carry on any self care. Totally confined to bed or chair [Normal] : normal appearance, no rash, nodules, vesicles, ulcers, erythema [de-identified] : Right basilar dullness [de-identified] : Wheelchair bound [de-identified] : Anxious

## 2020-12-31 NOTE — ASSESSMENT
[FreeTextEntry1] : Right lung adenocarcinoma\par Stage IV (pleural effusion, celiac lymph node)\par MRI joyce without brain metastases\par Ho renal transplant \par Hepatitic C - started on epclusa  7/18/20\par Foundation 1- No mutations with approved treatment for lung cancer\par TMB 15 mut/mb -> ?? possibly predicts response to immunotherapy\par s/p C1 carboplatin alimta keytruda (8/5/20)\par Had transplant failure, now on hemodialysis M-W-F\par Decided to forgo any treatments until recently when he changed his mind\par Had worsening sob, declining performance status, Wheelchair bound\par Decided to resume treatment\par \par Here for C4\par Labs reviewed, analyzed and discussed\par Proceed with chemo\par Restaging scans prior to next appointment\par Reaction to taxol- to run slowly. Dex 20 mg premed with tylenol and benadryl.\par If has reaction again, will switch to taxotere or abraxane\par Plan\par Paclitaxel 80mg/m2, Carbo AUC 4, Tecentriq 1200, avastin 5 mg/kg (due to baseline elevated BP)\par Avastin will help with his pleural effusion\par \par Cough with yellow expectoration\par Levaquin 250 mg QOD (dialysis dosing)\par \par Anemia\par Likely multifactorial- Anemia of kidney disease vs other causes\par He is on procrit/aranesp.\par \par Elevated \par Positive celiac node (which is unusual site of metastases with lung cancer)\par Family history of pancreatic cancer\par MRI abdomen approved by insurance. he initially declined, now agreeable\par \par Decontioning with weakness and fatigue - patient is on 02 supplement and requiring hospital bed. \par Family ho pancreatic cancer\par Genetic testing negative\par \par Follow up in 3 weeks for C5\par CBC, CMP, CEA, \par \par

## 2020-12-31 NOTE — CONSULT LETTER
[Dear  ___] : Dear  [unfilled], [Courtesy Letter:] : I had the pleasure of seeing your patient, [unfilled], in my office today. [Please see my note below.] : Please see my note below. [Consult Closing:] : Thank you very much for allowing me to participate in the care of this patient.  If you have any questions, please do not hesitate to contact me. [Sincerely,] : Sincerely, [DrLilliana  ___] : Dr. GRIFFIN [FreeTextEntry3] : Gerardo Blanco MD, MPH\par Attending Physician\par Hematology Oncology\par [facility]\par [facility]\par

## 2021-01-01 ENCOUNTER — APPOINTMENT (OUTPATIENT)
Dept: GASTROENTEROLOGY | Facility: HOSPITAL | Age: 66
End: 2021-01-01

## 2021-01-01 ENCOUNTER — NON-APPOINTMENT (OUTPATIENT)
Age: 66
End: 2021-01-01

## 2021-01-01 ENCOUNTER — RESULT REVIEW (OUTPATIENT)
Age: 66
End: 2021-01-01

## 2021-01-01 ENCOUNTER — RX RENEWAL (OUTPATIENT)
Age: 66
End: 2021-01-01

## 2021-01-01 ENCOUNTER — APPOINTMENT (OUTPATIENT)
Dept: HEMATOLOGY ONCOLOGY | Facility: CLINIC | Age: 66
End: 2021-01-01
Payer: COMMERCIAL

## 2021-01-01 VITALS
HEART RATE: 73 BPM | SYSTOLIC BLOOD PRESSURE: 160 MMHG | HEIGHT: 71.97 IN | DIASTOLIC BLOOD PRESSURE: 79 MMHG | BODY MASS INDEX: 26.01 KG/M2 | WEIGHT: 192 LBS | TEMPERATURE: 97.1 F | RESPIRATION RATE: 18 BRPM | OXYGEN SATURATION: 100 %

## 2021-01-01 DIAGNOSIS — K59.00 CONSTIPATION, UNSPECIFIED: ICD-10-CM

## 2021-01-01 DIAGNOSIS — Z79.899 OTHER LONG TERM (CURRENT) DRUG THERAPY: ICD-10-CM

## 2021-01-01 DIAGNOSIS — Z51.11 ENCOUNTER FOR ANTINEOPLASTIC CHEMOTHERAPY: ICD-10-CM

## 2021-01-01 DIAGNOSIS — R19.7 DIARRHEA, UNSPECIFIED: ICD-10-CM

## 2021-01-01 DIAGNOSIS — C34.90 MALIGNANT NEOPLASM OF UNSPECIFIED PART OF UNSPECIFIED BRONCHUS OR LUNG: ICD-10-CM

## 2021-01-01 DIAGNOSIS — D64.9 ANEMIA, UNSPECIFIED: ICD-10-CM

## 2021-01-01 DIAGNOSIS — R53.81 OTHER MALAISE: ICD-10-CM

## 2021-01-01 PROCEDURE — 99215 OFFICE O/P EST HI 40 MIN: CPT

## 2021-01-01 PROCEDURE — 99072 ADDL SUPL MATRL&STAF TM PHE: CPT

## 2021-01-01 RX ORDER — LOPERAMIDE HYDROCHLORIDE 2 MG/1
2 CAPSULE ORAL
Qty: 30 | Refills: 0 | Status: ACTIVE | COMMUNITY
Start: 2021-01-01 | End: 1900-01-01

## 2021-01-01 RX ORDER — DEXAMETHASONE 4 MG/1
4 TABLET ORAL
Qty: 2 | Refills: 4 | Status: ACTIVE | COMMUNITY
Start: 2020-01-01 | End: 1900-01-01

## 2021-01-01 RX ORDER — AMOXICILLIN AND CLAVULANATE POTASSIUM 875; 125 MG/1; MG/1
875-125 TABLET, COATED ORAL
Qty: 14 | Refills: 0 | Status: ACTIVE | COMMUNITY
Start: 2021-01-01 | End: 1900-01-01

## 2021-01-04 PROBLEM — J18.9 PNEUMONIA: Status: ACTIVE | Noted: 2020-01-01

## 2021-01-15 PROBLEM — R19.7 DIARRHEA: Status: ACTIVE | Noted: 2021-01-01

## 2021-01-21 PROBLEM — Z51.11 ENCOUNTER FOR CHEMOTHERAPY MANAGEMENT: Status: ACTIVE | Noted: 2020-01-01

## 2021-01-21 PROBLEM — K59.00 CONSTIPATION: Status: ACTIVE | Noted: 2021-01-01

## 2021-01-21 PROBLEM — R53.81 DECLINING PERFORMANCE STATUS: Status: ACTIVE | Noted: 2020-01-01

## 2021-01-21 PROBLEM — D64.9 ANEMIA: Status: ACTIVE | Noted: 2020-01-01

## 2021-01-21 PROBLEM — Z79.899 ON ANTINEOPLASTIC CHEMOTHERAPY: Status: ACTIVE | Noted: 2020-01-01

## 2021-01-21 NOTE — ASSESSMENT
[FreeTextEntry1] : Right lung adenocarcinoma\par Stage IV (pleural effusion, celiac lymph node)\par MRI joyce without brain metastases\par Ho renal transplant \par Hepatitic C - started on epclusa  7/18/20\par Foundation 1- No mutations with approved treatment for lung cancer\par TMB 15 mut/mb -> ?? possibly predicts response to immunotherapy\par s/p C1 carboplatin alimta keytruda (8/5/20)\par Had transplant failure, now on hemodialysis M-W-F\par Decided to forgo any treatments until recently when he changed his mind\par Had worsening sob, declining performance status, Wheelchair bound\par Decided to resume treatment\par \par Here for C5\par Labs reviewed, analyzed and discussed\par Proceed with chemo\par Reaction to taxol- to run slowly. Dex 20 mg premed with tylenol and benadryl.\par If has reaction again, will switch to taxotere or abraxane\par Plan\par Paclitaxel 80mg/m2, Carbo AUC 4, Tecentriq 1200, avastin 5 mg/kg (due to baseline elevated BP)\par Avastin will help with his pleural effusion\par \par Constipation\par Hydration, fiber diet\par To try magnesium citrate tomorrow AM (prior to the dialysis)\par \par Anemia\par Likely multifactorial- Anemia of kidney disease vs other causes\par He is on procrit/aranesp.\par \par Elevated \par Positive celiac node (which is unusual site of metastases with lung cancer)\par Family history of pancreatic cancer\par MRI abdomen approved by insurance. he initially declined, now agreeable\par \par Deconditioning with weakness and fatigue - patient is on 02 supplement and requiring hospital bed. \par \par Family ho pancreatic cancer\par Genetic testing negative\par \par Follow up in 3 weeks for C5\par CBC, CMP, CEA, \par \par

## 2021-01-21 NOTE — HISTORY OF PRESENT ILLNESS
[de-identified] : Mr. Corbin Smith is pleasant 64 year old male with history Hepatic C, hypertension, kidney transplant (2009 from [facility]) referred by Dr. Dobbins for evaluation on adenocarcinoma of right lung. \par \par In January, patient initially presented with bronchitis symptoms with worsenign of cough. Patient was not able to follow up with any doctors due to Covid pandemic. Patient then had worsening of shortness of breath with right thoracentesis.  \par \par 5/15/20: 38 65-cc of serous fluid was aspirated of right lung:  POSITIVE FOR MALIGNANT CELLS - ADENOCARCINOMA.\par \par 20 CT of chest: A loculated residual right-sided pleural effusion is identified with scattered air bubbles within it consistent with recent thoracentesis procedure. Chronic changes of the lung are apparent. There is a small pleural-based nodular thickening at the right apex measuring 1 cm best visualized on image 13 of series 4. In addition there is pleural-based thickening with associated nodular component identified in the mid lung posteromedially best visualized on image 28 of series 4 and 54 of series 5 and this measures 1.8 x 1.6 x 1.9 cm. The remainder of the right lung reveals areas of compressive atelectasis and linear atelectasis.\par There are shotty precarinal lymph nodes the largest measures 1.6 x 0.9 cm.  Bone survey reveals no suspicious abnormalities.\par IMPRESSION: Large loculated right-sided pleural effusion with small component of air most likely representing recent thoracentesis procedure. There are 2 areas of pleural based nodular mass like thickening in the right lung\par \par 20: Right lung, CT-guided biopsy:  Adenocarcinoma consistent with lung primary site.\par Patient with mother, maternal grandmother and aunt with pancreatic cancer in their 79s and paternal grandmother with stroke and htn.  \par Patient has been smoking 1/2 a day since he's 12 and stills smokes, he has nicotine patch but has yet tried.\par \par Patient reports of 25 lbs weight loss due to combination of diet changes and thoracentesis since 2020.  Patient reports of being fatigue but no recent weight loss or . \par \par No fevers chills night sweats tiredness, apathy\par No chest pain,  palpitation, dizziness\par No abdominal pain, nausea, vomiting, diarrhea, constipation\par No bright red blood per rectum, hematemesis or melena\par No hematuria, dysuria, frequency, urgency\par No headaches, visual changes, focal weakness, tingling, numbness\par \par \par He unfortunately developed ESRD following first cycle of chemoimmunotherapy and is now on dialysis \par He decide for forgo treatment at that time and has not had any treatment since 20\par He has chest tube and drained 400 cc few days ago.\par Overall has had worsening shortness of breath. He is now unable to walk and is wheelchair bound. Has had significant decline in his quality of life and performance status\par He changed his mind and came for treatment today\par \par  [de-identified] : He is seen today for C5 Carboplatin paclitaxel tecentriq and avastin (10/29/20 - present) \par He is s/p carboplatin alimta and keytruda (8/5/20)\par On antiviral therapy (epclusa) for hepatitis C on 7/18/20\par \par He reports feeling the same\par Tired, fatigued\par Cough\par Co severe constipation- miralax, colace don’t help\par Drains 400 from right lung 2 times a week\par O2 100% on 1L NC\par Advised to try to target O2 92-95%\par

## 2021-01-21 NOTE — PHYSICAL EXAM
[Completely disabled. Cannot carry on any self care. Totally confined to bed or chair] : Status 4- Completely disabled. Cannot carry on any self care. Totally confined to bed or chair [Normal] : normal appearance, no rash, nodules, vesicles, ulcers, erythema [de-identified] : Right basilar dullness [de-identified] : Anxious [de-identified] : Wheelchair bound

## 2021-01-21 NOTE — CONSULT LETTER
[Dear  ___] : Dear  [unfilled], [Courtesy Letter:] : I had the pleasure of seeing your patient, [unfilled], in my office today. [Please see my note below.] : Please see my note below. [Consult Closing:] : Thank you very much for allowing me to participate in the care of this patient.  If you have any questions, please do not hesitate to contact me. [Sincerely,] : Sincerely, [DrLilliana  ___] : Dr. GRIFFIN [FreeTextEntry3] : Gerardo Blanco MD, MPH\par Attending Physician\par Hematology Oncology\par [facility]\par [facility]\par

## 2021-02-04 PROBLEM — C34.90 ADENOCARCINOMA, LUNG: Status: ACTIVE | Noted: 2020-01-01

## 2021-02-11 ENCOUNTER — APPOINTMENT (OUTPATIENT)
Dept: HEMATOLOGY ONCOLOGY | Facility: CLINIC | Age: 66
End: 2021-02-11

## 2021-03-04 ENCOUNTER — APPOINTMENT (OUTPATIENT)
Dept: HEMATOLOGY ONCOLOGY | Facility: CLINIC | Age: 66
End: 2021-03-04

## 2021-03-12 ENCOUNTER — NON-APPOINTMENT (OUTPATIENT)
Age: 66
End: 2021-03-12